# Patient Record
Sex: FEMALE | Race: WHITE
[De-identification: names, ages, dates, MRNs, and addresses within clinical notes are randomized per-mention and may not be internally consistent; named-entity substitution may affect disease eponyms.]

---

## 2018-11-30 ENCOUNTER — HOSPITAL ENCOUNTER (EMERGENCY)
Dept: HOSPITAL 80 - FED | Age: 28
Discharge: HOME | End: 2018-11-30
Payer: MEDICAID

## 2018-11-30 VITALS — SYSTOLIC BLOOD PRESSURE: 121 MMHG | DIASTOLIC BLOOD PRESSURE: 73 MMHG

## 2018-11-30 DIAGNOSIS — S09.90XA: Primary | ICD-10-CM
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
## 2018-11-30 NOTE — EDPHY
H & P


Stated Complaint: hit in head with surge protector


Time Seen by Provider: 11/30/18 16:09





- Personal History


LMP (Females 10-55): Now


Current Tetanus/Diphtheria Vaccine: Unsure


Current Tetanus Diphtheria and Acellular Pertussis (TDAP): Unsure





- Medical/Surgical History


Hx Asthma: No


Hx Chronic Respiratory Disease: No


Hx Diabetes: No


Hx Cardiac Disease: No


Hx Renal Disease: No


Hx Cirrhosis: No


Hx Alcoholism: No


Hx HIV/AIDS: No


Hx Splenectomy or Spleen Trauma: No


Other PMH: ORTHOPEDIC ISSUES, DISLOCATED RIB, MIGRAINES





- Social History


Smoking Status: Never smoked


Constitutional: 


 Initial Vital Signs











Temperature (C)  37.1 C   11/30/18 16:02


 


Heart Rate  73   11/30/18 16:02


 


Respiratory Rate  16   11/30/18 16:02


 


Blood Pressure  121/73 H  11/30/18 16:02


 


O2 Sat (%)  98   11/30/18 16:02








 











O2 Delivery Mode               Room Air














Allergies/Adverse Reactions: 


 





No Known Allergies Allergy (Unverified 11/30/18 16:02)


 








Home Medications: 














 Medication  Instructions  Recorded


 


NK [No Known Home Meds]  05/17/15














Medical Decision Making


ED Course/Re-evaluation: 





CHIEF COMPLAINT:  Struck in head with surge protector





HISTORY OF PRESENT ILLNESS:  The patient is a 29 y/o female arriving with her 

friend for a workman's comp injury after she was accidentally struck on the 

left side of her head with a surge protector. She did not lose consciousness 

and denies midline neck pain, weakness, paresthesias. She has mild tenderness 

at the site. She is generally healthy. She went to urgent care and was referred 

directly to the ED for evaluation. 





REVIEW OF SYSTEMS:  





A comprehensive 10 system review of systems is otherwise negative aside from 

elements mentioned in the history of present illness and medical decision 

making.





PHYSICAL EXAM:  





HR, BP, O2 Sat, RR.  Temp noted


General Appearance:  Alert, well hydrated, appropriate, and non-toxic appearing.


Head:  Atraumatic without scalp tenderness or obvious injury


Eyes:  Pupils equal, round, reactive to light and accommodation, EOMI, no trauma

, no injection.


Nose:  Atraumatic, no rhinorrhea, clear.


Throat:  Mucus membranes moist.


Neck:  Supple, nontender, no lymphadenopathy.


Respiratory:  No retractions, no distress, no wheezes, and no accessory muscle 

use.  Lungs are clear to auscultation bilaterally.


Cardiovascular:  Regular rate and rhythm, no murmurs, rubs, or gallops. Good 

capillary refill all extremities.


Gastrointestinal:  Abdomen is soft, nontender, non-distended, no masses, no 

rebound, no guarding, no peritoneal signs.


Musculoskeletal:  Normal active ROM of all extremities, atraumatic.


Neurological:  Alert, appropriate, and interactive.  The patient has non-focal 

cranial nerves, motor, sensory, and cerebellar exam.


Skin:  No rashes, good turgor, no nodules on palpation.





Past medical history: Migraines


Past surgical history: Noncontributory


Family history: Noncontributory


Social history: Friend at bedside. Lives in [place]. Employed. 





DIFFERENTIAL DIAGNOSIS:   The differential diagnosis for the patient's head 

injury included but was not limited to concussion, skull fracture, intra-

parenchymal contusion, subarachnoid, subdural and epidural hematoma.





MEDICAL DECISION MAKING:  





This is a healthy 29 y/o female who presents for a workman's comp injury after 

she was accidentally struck in the head with a surge protector. Exam is 

completely normal. She does not meet any criteria for neuro imaging based on 

[place] rules. Recommended discharge home without work restrictions. 

Standard head injury care and follow up instructions discussed. She is 

comfortable with this plan. 





Departure





- Departure


Disposition: Home, Routine, Self-Care


Clinical Impression: 


Head injury


Qualifiers:


 Encounter type: initial encounter Qualified Code(s): S09.90XA - Unspecified 

injury of head, initial encounter





Condition: Good


Instructions:  Concussion (ED), Head Injury (ED)


Additional Instructions: 


1. Tylenol and ibuprofen as directed on the packaging as needed for pain over 

the next few days.


2. No work restrictions.


3. Follow up with head injury specialist if needed for any persistent problems 

over the next week.


4. Return to the ED for any worsening of condition. 





Follow up with your company's HR department as directed by them for workman's 

comp injury.


Referrals: 


Yumiko Francis MD [Medical Doctor] - As per Instructions


Report Scribed for: Marciano Carlos


Report Scribed by: Suni Bess


Date of Report: 11/30/18


Time of Report: 16:09